# Patient Record
(demographics unavailable — no encounter records)

---

## 2025-07-17 NOTE — PHYSICAL EXAM
[Alert] : alert [Normal Voice/Communication] : normal voice/communication [Healthy Appearing] : healthy appearing [No Acute Distress] : no acute distress [Sclera] : the sclera and conjunctiva were normal [Hearing Threshold Finger Rub Not [place]] : hearing was normal [Normal Lips/Gums] : the lips and gums were normal [Oropharynx] : the oropharynx was normal [Normal Appearance] : the appearance of the neck was normal [No Neck Mass] : no neck mass was observed [No Respiratory Distress] : no respiratory distress [No Acc Muscle Use] : no accessory muscle use [Respiration, Rhythm And Depth] : normal respiratory rhythm and effort [Auscultation Breath Sounds / Voice Sounds] : lungs were clear to auscultation bilaterally [Heart Rate And Rhythm] : heart rate was normal and rhythm regular [Normal S1, S2] : normal S1 and S2 [Murmurs] : no murmurs [Bowel Sounds] : normal bowel sounds [Abdomen Tenderness] : non-tender [No Masses] : no abdominal mass palpated [Abdomen Soft] : soft [] : no hepatosplenomegaly [Oriented To Time, Place, And Person] : oriented to person, place, and time [de-identified] : Well-developed well-nourished medium frame and build.  No acute distress. [FreeTextEntry1] : Anicteric sclera.   [de-identified] : No pharyngitis.  Moist mucosa.  Mallampati #2. [de-identified] : Supple neck.  No adenopathy. [de-identified] : Clear. [de-identified] : No murmurs rubs or gallops. [de-identified] : Flat soft bowel sounds present.  No organomegaly.  No mass tenderness or rebound.  No hernias no scars. [de-identified] : Deferred to procedure. [de-identified] : Normal. [de-identified] : Normal. [de-identified] : Normal.

## 2025-07-17 NOTE — ASSESSMENT
[FreeTextEntry1] : Average risk for development of colorectal neoplasia.  Negative family history.  Patient is asymptomatic.  Physical exam is unremarkable.  There is a personal history of sigmoid colon diverticulosis and internal hemorrhoids.  No alarm symptoms.  Recent blood work will normal.  As such patient is still at average risk for development of colorectal neoplasia.  Therefore a screening colonoscopy was offered to the patient.  The procedure, its risk benefits and prep, were explained to the patient, who understands and is agreeable to proceed.  ASA #2.  Mallampati #2.  Repeat blood work not necessary.  Suflave prep to be utilized.  Appropriate candidate for colonoscopy at the ambulatory endoscopy center.  No clearances required.  Patient appears to be in optimal medical condition to undergo planned procedure.  Arrangements made.  Results to follow.

## 2025-07-17 NOTE — HISTORY OF PRESENT ILLNESS
[FreeTextEntry1] : 72-year-old white male with hypertension hyperlipidemia peripheral artery disease.  Coronary artery disease with history of LAD stent placed 9/24.  Followed by Dr. Wood cardiology at [facility], in [place]. Patient is due for cardiology reevaluation shortly.  He is still on aspirin Crestor losartan and fish oil.  He Plavix had been discontinued several months ago.  Currently he is without any active cardiac symptoms.  No shortness of breath palpitations or syncope. Patient is referred now for screening colonoscopy.  He has had a prior colonoscopy done in 12/15 by Dr. De Leon in [place] with finding of sigmoid colon diverticulosis and internal hemorrhoids.  Family history is negative for colorectal neoplasia.  Patient was seen here in the office 3 years ago for consideration of colon cancer screening.  Due to average risk and asymptomatic status patient was advised to defer the colonoscopy for another 3 years.  He returns now for a screening colonoscopy.  He reports bowel movements to be normal.  No rectal bleeding.  No abdominal pain.  No hemorrhoids.  No itching no burning.  Patient may have had a hemorrhoid ligation in the past.  He denies having any diarrhea or constipation.  He denies having any abdominal pain or weight loss.  Recent blood work performed by Dr. Nestor Briscoe within the last year was normal.. No medical issues.  No pulmonary issues.

## 2025-07-24 NOTE — HISTORY OF PRESENT ILLNESS
[Sudden] : sudden [8] : 8 [3] : 3 [Dull/Aching] : dull/aching [Throbbing] : throbbing [Constant] : constant [Rest] : rest [Meds] : meds [Full time] : Work status: full time [de-identified] : Patient is here today for the 4th toe of his left foot. Pain began on 7/15/25. Patient states that he walked into a door frame. Patient reports intermittent sharp pain over 4th toe that is worse with movement and ambulation. Patient had x-rays at [facility] on 7/17/25 where he had x-rays and gave the patient a hard soled shoe. Patient notes that he occasionally takes Motrin PRN for pain with some relief. Patient came into the office today ambulating in hard soled shoe accompanied by his wife.  [] : Post Surgical Visit: no [FreeTextEntry1] : Left foot 4th toe [FreeTextEntry3] : 7/15/25 [FreeTextEntry5] : Patient states that he walked into a door frame [de-identified] : 7/17/25 [de-identified] : [facility] [de-identified] : XR at [facility] on 7/17/25 [de-identified] :

## 2025-07-24 NOTE — PHYSICAL EXAM
[de-identified] : Examination of the left foot and ankle is as follows: INSPECTION: mild dorsal foot swelling and 4th toe swelling, but no abrasion, no laceration, no erythema, no ecchymosis, no gross deformity PALPATION: ttp over 4th proximal phalanx, no ttp over lisfranc ROM: dorsiflexion 20 degrees, plantar flexion 40 degrees, inversion 30 degrees, eversion 20 degrees. STRENGTH: dorsiflexion 5/5. plantar flexion 5/5, inversion 5/5, eversion 5/5, EHL 4/5, FHL 4/5 VASCULAR: dorsalis pedis pulse: 2+, posterior tibialis pulse: 2+ NEURO: Sensation present to light touch in all distributions GAIT: mildly antalgic, but patient ambulates without assistive device   X-rays of the left foot is as follows: [facility] Foot 3 view AP/Lateral/Oblique: 4th proximal phalanx fracture, fracture is in acceptable position and alignment

## 2025-07-24 NOTE — ASSESSMENT
[FreeTextEntry1] : 71 yo male presenting today with left 4th proximal phalanx fracture. Recommend non-surgical management. -LLE WBAT in hard soled shoe, may ween off and transition to regular shoes to his tolerance -Avoid strenuous/impact related activities -Rest, ice, compression, elevation, NSAIDs PRN for pain. -All questions answered -F/u 6 weeks with repeat x-rays   The diagnosis was explained in detail. The potential non-surgical and surgical treatments were reviewed. The relative risks and benefits of each option were considered relative to the patients age, activity level, medical history, symptom severity and previously attempted treatments.   The patient was advised to consult with their primary medical provider prior to initiation of any new medications to reduce the risk of adverse effects specific to their long-term home medications and medical history.   The patient's current medications management of their orthopedic diagnosis was reviewed today:   1) We discussed a comprehensive treatment plan that included possible pharmaceutical management involving the use of prescription strength medications including but not limited to options as oral Naprosyn 500mg BID, once daily Meloxicam 15 mg, or 500-650 mg Tylenol versus over-the-counter oral medications and topical prescriptions NSAID Pennsaid vs over the counter Voltaren gel.   2) There is a moderate risk of morbidity with further treatment, especially from use of prescription strength medications and possible side effects of these medications which include upset stomach with oral medications, skin reactions to topical medications and cardiac/renal issues with long term use.   3) I recommended that the patient follow-up with their medical physician to discuss any significant specific potential issues with long term medication use such as interactions with current medications or with exacerbation of underlying medical comorbidities.   4) The benefits and risks associated with use of injectable, oral or topical, prescription and over the counter anti-inflammatory medications were discussed with the patient. The patient voiced understanding of the risks including but not limited to bleeding, stroke, kidney dysfunction, heart disease, and were referred to the black box warning label for further information  Entered by Nitish Skelton PA-C acting as scribe. Dr. Augustin Attestation The documentation recorded by the scribe, in my presence, accurately reflects the service I, Dr. Augustin, personally performed, and the decisions made by me with my edits as appropriate.